# Patient Record
Sex: MALE | Race: WHITE | URBAN - METROPOLITAN AREA
[De-identification: names, ages, dates, MRNs, and addresses within clinical notes are randomized per-mention and may not be internally consistent; named-entity substitution may affect disease eponyms.]

---

## 2023-04-27 ENCOUNTER — APPOINTMENT (RX ONLY)
Dept: URBAN - METROPOLITAN AREA CLINIC 100 | Facility: CLINIC | Age: 53
Setting detail: DERMATOLOGY
End: 2023-04-27

## 2023-04-27 DIAGNOSIS — L20.89 OTHER ATOPIC DERMATITIS: ICD-10-CM | Status: WELL CONTROLLED

## 2023-04-27 DIAGNOSIS — L29.89 OTHER PRURITUS: ICD-10-CM | Status: STABLE

## 2023-04-27 PROBLEM — L29.8 OTHER PRURITUS: Status: ACTIVE | Noted: 2023-04-27

## 2023-04-27 PROCEDURE — 99214 OFFICE O/P EST MOD 30 MIN: CPT

## 2023-04-27 PROCEDURE — ? COUNSELING

## 2023-04-27 PROCEDURE — ? PRESCRIPTION MEDICATION MANAGEMENT

## 2023-04-27 ASSESSMENT — LOCATION SIMPLE DESCRIPTION DERM
LOCATION SIMPLE: LEFT HAND
LOCATION SIMPLE: RIGHT FOREARM
LOCATION SIMPLE: RIGHT HAND
LOCATION SIMPLE: LEFT FOREARM

## 2023-04-27 ASSESSMENT — LOCATION ZONE DERM
LOCATION ZONE: ARM
LOCATION ZONE: HAND

## 2023-04-27 ASSESSMENT — LOCATION DETAILED DESCRIPTION DERM
LOCATION DETAILED: RIGHT ULNAR PALM
LOCATION DETAILED: RIGHT VENTRAL PROXIMAL FOREARM
LOCATION DETAILED: LEFT THENAR EMINENCE
LOCATION DETAILED: LEFT VENTRAL PROXIMAL FOREARM

## 2023-04-27 NOTE — PROCEDURE: PRESCRIPTION MEDICATION MANAGEMENT
Continue Regimen: Dupixent 300 mg SC every two weeks at home. [address] [facility] is sending medications.
Detail Level: Zone
Render In Strict Bullet Format?: No
Plan: patient last seen Oct 2022\\ndoing well on dupixent injections\\npatient's eczema is moderate to severe. original bsa was over 50% with IGA score 3-4.  special sites affected hands. now on dupixent 300 mg injections every 2 weeks, BSA is now 1 %. doing well.  patient has tried and failed clobetasol cream, triamcinolone cream tacrolimus 0.1% ointment, oral prednisone tablets. he cannot do light therapy due to long drive.  he cannot do methotrexate, mycophenolate or cyclosporine due to risk of liver and kidney injury
Continue Regimen: Dupixent 300 mg SC every two weeks at home. [facility] pharmacy is sending medications.

## 2023-05-18 ENCOUNTER — APPOINTMENT (RX ONLY)
Dept: URBAN - METROPOLITAN AREA CLINIC 100 | Facility: CLINIC | Age: 53
Setting detail: DERMATOLOGY
End: 2023-05-18

## 2023-05-18 DIAGNOSIS — L20.89 OTHER ATOPIC DERMATITIS: ICD-10-CM

## 2023-05-18 PROCEDURE — 96372 THER/PROPH/DIAG INJ SC/IM: CPT | Mod: NC

## 2023-05-18 PROCEDURE — 99213 OFFICE O/P EST LOW 20 MIN: CPT | Mod: NC

## 2023-05-18 PROCEDURE — ? DUPIXENT INJECTION

## 2023-05-18 ASSESSMENT — LOCATION ZONE DERM: LOCATION ZONE: TRUNK

## 2023-05-18 ASSESSMENT — LOCATION DETAILED DESCRIPTION DERM: LOCATION DETAILED: PERIUMBILICAL SKIN

## 2023-05-18 ASSESSMENT — LOCATION SIMPLE DESCRIPTION DERM: LOCATION SIMPLE: ABDOMEN

## 2023-05-18 NOTE — PROCEDURE: DUPIXENT INJECTION
Ndc (300 Mg Prefilled Syringe): [address]-19
23158 Billing Preferences: 1
Was The Medication Purchased By The Clinic?: No
Expiration Date (Optional): 01/2024
Use Enhanced Ndc?: Yes
Ndc (300 Mg Prefilled Pen): [address]-86
J-Code: 
Detail Level: None
Syringe Size Used (Required For Enhanced Ndc): 300 mg/2ml prefilled syringe
Dupixent Amount: 300 mg
Consent: The risks of pain and injection site reactions were reviewed with the patient prior to the injection.
Ndc (200 Mg Prefilled Syringe): [address]-27
Administered By (Optional): Adilene Mackey
Lot # (Optional): [id number]

## 2023-11-09 ENCOUNTER — APPOINTMENT (RX ONLY)
Dept: URBAN - METROPOLITAN AREA CLINIC 100 | Facility: CLINIC | Age: 53
Setting detail: DERMATOLOGY
End: 2023-11-09

## 2023-11-09 DIAGNOSIS — L20.89 OTHER ATOPIC DERMATITIS: ICD-10-CM

## 2023-11-09 DIAGNOSIS — D22 MELANOCYTIC NEVI: ICD-10-CM

## 2023-11-09 PROBLEM — D22.4 MELANOCYTIC NEVI OF SCALP AND NECK: Status: ACTIVE | Noted: 2023-11-09

## 2023-11-09 PROCEDURE — 99213 OFFICE O/P EST LOW 20 MIN: CPT

## 2023-11-09 PROCEDURE — ? PRESCRIPTION MEDICATION MANAGEMENT

## 2023-11-09 PROCEDURE — ? COUNSELING

## 2023-11-09 ASSESSMENT — LOCATION ZONE DERM: LOCATION ZONE: NECK

## 2023-11-09 ASSESSMENT — LOCATION DETAILED DESCRIPTION DERM: LOCATION DETAILED: RIGHT CENTRAL LATERAL NECK

## 2023-11-09 ASSESSMENT — LOCATION SIMPLE DESCRIPTION DERM: LOCATION SIMPLE: NECK

## 2023-11-09 NOTE — PROCEDURE: PRESCRIPTION MEDICATION MANAGEMENT
Continue Regimen: Dupixent 300 mg SC every two weeks at home. [address] [facility] is sending medications.
Detail Level: Zone
Render In Strict Bullet Format?: No
Plan: follow up 6-12 months\\ndoing well on dupixent injections\\npatient's eczema is moderate to severe. original bsa was over 50% with IGA score 3-4.  special sites affected hands. now on dupixent 300 mg injections every 2 weeks, BSA is now 1 %. doing well.  patient has tried and failed clobetasol cream, triamcinolone cream tacrolimus 0.1% ointment, oral prednisone tablets. he cannot do light therapy due to long drive.  he cannot do methotrexate, mycophenolate or cyclosporine due to risk of liver and kidney injury

## 2024-05-09 ENCOUNTER — APPOINTMENT (RX ONLY)
Dept: URBAN - METROPOLITAN AREA CLINIC 100 | Facility: CLINIC | Age: 54
Setting detail: DERMATOLOGY
End: 2024-05-09

## 2024-05-09 DIAGNOSIS — L20.89 OTHER ATOPIC DERMATITIS: ICD-10-CM

## 2024-05-09 PROCEDURE — ? PRESCRIPTION MEDICATION MANAGEMENT

## 2024-05-09 PROCEDURE — ? PRESCRIPTION

## 2024-05-09 PROCEDURE — 99213 OFFICE O/P EST LOW 20 MIN: CPT

## 2024-05-09 PROCEDURE — ? COUNSELING

## 2024-05-09 RX ORDER — DUPILUMAB 300 MG/2ML
INJECTION, SOLUTION SUBCUTANEOUS
Qty: 2 | Refills: 11 | Status: ERX

## 2024-05-09 NOTE — PROCEDURE: PRESCRIPTION MEDICATION MANAGEMENT
Continue Regimen: Dupixent 300 mg SC every two weeks at home. [facility] pharmacy is sending medications.
Detail Level: Zone
Render In Strict Bullet Format?: No
Plan: follow up 6-12 months\\ndoing well on dupixent injections\\npatient's eczema is moderate to severe. original bsa was over 50% with IGA score 3-4.  special sites affected hands. now on dupixent 300 mg injections every 2 weeks, BSA is now 1 %. doing well.  patient has tried and failed clobetasol cream, triamcinolone cream tacrolimus 0.1% ointment, oral prednisone tablets all used over 6 months. he cannot do light therapy due to long drive. he cannot do methotrexate due to alcohol consumption, mycophenolate or cyclosporine due to risk of liver and kidney injury

## 2024-09-26 ENCOUNTER — APPOINTMENT (RX ONLY)
Dept: URBAN - METROPOLITAN AREA CLINIC 100 | Facility: CLINIC | Age: 54
Setting detail: DERMATOLOGY
End: 2024-09-26

## 2024-09-26 DIAGNOSIS — L20.89 OTHER ATOPIC DERMATITIS: ICD-10-CM

## 2024-09-26 PROCEDURE — ? PRESCRIPTION

## 2024-09-26 PROCEDURE — 99213 OFFICE O/P EST LOW 20 MIN: CPT

## 2024-09-26 PROCEDURE — ? COUNSELING

## 2024-09-26 PROCEDURE — ? PRESCRIPTION MEDICATION MANAGEMENT

## 2024-09-26 RX ORDER — DUPILUMAB 300 MG/2ML
INJECTION, SOLUTION SUBCUTANEOUS
Qty: 2 | Refills: 11 | Status: CANCELLED
Stop reason: CLARIF

## 2024-09-26 NOTE — PROCEDURE: PRESCRIPTION MEDICATION MANAGEMENT
Continue Regimen: Dupixent 300 mg SC every two weeks at home. [facility] pharmacy is sending medications.
Detail Level: Zone
Render In Strict Bullet Format?: No
Plan: follow up 6-12 months\\ndoing well on dupixent injections\\npatient's eczema is moderate to severe. original bsa was over 50% with IGA score 3-4.  special sites affected hands. now on dupixent 300 mg injections every 2 weeks, BSA is now 1 %. doing well.  patient has tried and failed clobetasol cream, triamcinolone cream tacrolimus 0.1% ointment, oral prednisone tablets all used over 6 months. he cannot do light therapy due to long drive. he cannot do methotrexate due to alcohol consumption, mycophenolate or cyclosporine due to risk of liver and kidney injury\\n*re-do PA May 10 2025*

## 2025-05-01 ENCOUNTER — APPOINTMENT (OUTPATIENT)
Dept: URBAN - METROPOLITAN AREA CLINIC 100 | Facility: CLINIC | Age: 55
Setting detail: DERMATOLOGY
End: 2025-05-01

## 2025-05-01 DIAGNOSIS — L20.89 OTHER ATOPIC DERMATITIS: ICD-10-CM

## 2025-05-01 PROCEDURE — ? PRESCRIPTION MEDICATION MANAGEMENT

## 2025-05-01 PROCEDURE — ? COUNSELING

## 2025-05-01 PROCEDURE — 99213 OFFICE O/P EST LOW 20 MIN: CPT

## 2025-05-01 RX ORDER — DUPILUMAB 300 MG/2ML
INJECTION, SOLUTION SUBCUTANEOUS
Qty: 2 | Refills: 11 | Status: ERX

## 2025-05-01 NOTE — PROCEDURE: PRESCRIPTION MEDICATION MANAGEMENT
Continue Regimen: Dupixent 300 mg SC every two weeks at home. [facility] pharmacy is sending medications.
Detail Level: Zone
Render In Strict Bullet Format?: No
Plan: follow up 6-12 months\\ndoing well on dupixent injections\\npatient's eczema is moderate to severe. original bsa was over 50% with IGA score 3-4.  special sites affected hands. now on dupixent 300 mg injections every 2 weeks, BSA is now 1 %. doing well.  patient has tried and failed clobetasol cream, triamcinolone cream tacrolimus 0.1% ointment, oral prednisone tablets all used over 6 months. he cannot do light therapy due to long drive. he cannot do methotrexate due to alcohol consumption, mycophenolate or cyclosporine due to risk of liver and kidney injury\\n*PA approved until 4/1/26*
Quality 226: Preventive Care And Screening: Tobacco Use: Screening And Cessation Intervention: Patient screened for tobacco use and is an ex/non-smoker
Detail Level: Detailed